# Patient Record
Sex: MALE | Race: WHITE | NOT HISPANIC OR LATINO | URBAN - NONMETROPOLITAN AREA
[De-identification: names, ages, dates, MRNs, and addresses within clinical notes are randomized per-mention and may not be internally consistent; named-entity substitution may affect disease eponyms.]

---

## 2023-04-05 ENCOUNTER — OFFICE VISIT (OUTPATIENT)
Dept: PRIMARY CARE | Facility: CLINIC | Age: 53
End: 2023-04-05
Payer: COMMERCIAL

## 2023-04-05 VITALS
HEART RATE: 81 BPM | HEIGHT: 70 IN | DIASTOLIC BLOOD PRESSURE: 70 MMHG | WEIGHT: 225 LBS | SYSTOLIC BLOOD PRESSURE: 128 MMHG | BODY MASS INDEX: 32.21 KG/M2 | OXYGEN SATURATION: 98 %

## 2023-04-05 DIAGNOSIS — Z13.1 DIABETES MELLITUS SCREENING: ICD-10-CM

## 2023-04-05 DIAGNOSIS — Z13.0 SCREENING FOR DEFICIENCY ANEMIA: ICD-10-CM

## 2023-04-05 DIAGNOSIS — Z00.00 ROUTINE GENERAL MEDICAL EXAMINATION AT A HEALTH CARE FACILITY: Primary | ICD-10-CM

## 2023-04-05 DIAGNOSIS — Z11.59 ENCOUNTER FOR HEPATITIS C SCREENING TEST FOR LOW RISK PATIENT: ICD-10-CM

## 2023-04-05 DIAGNOSIS — E78.2 MIXED HYPERLIPIDEMIA: ICD-10-CM

## 2023-04-05 DIAGNOSIS — K21.9 GASTROESOPHAGEAL REFLUX DISEASE WITHOUT ESOPHAGITIS: ICD-10-CM

## 2023-04-05 DIAGNOSIS — Z12.5 PROSTATE CANCER SCREENING: ICD-10-CM

## 2023-04-05 DIAGNOSIS — Z23 NEED FOR VACCINATION: ICD-10-CM

## 2023-04-05 DIAGNOSIS — Z13.220 LIPID SCREENING: ICD-10-CM

## 2023-04-05 DIAGNOSIS — I10 PRIMARY HYPERTENSION: ICD-10-CM

## 2023-04-05 DIAGNOSIS — Z12.11 COLON CANCER SCREENING: ICD-10-CM

## 2023-04-05 PROBLEM — E66.9 CLASS 1 OBESITY WITHOUT SERIOUS COMORBIDITY WITH BODY MASS INDEX (BMI) OF 32.0 TO 32.9 IN ADULT: Status: ACTIVE | Noted: 2023-04-05

## 2023-04-05 PROBLEM — K42.9 UMBILICAL HERNIA WITHOUT OBSTRUCTION AND WITHOUT GANGRENE: Status: ACTIVE | Noted: 2023-04-05

## 2023-04-05 PROBLEM — G47.33 OSA ON CPAP: Status: ACTIVE | Noted: 2023-04-05

## 2023-04-05 PROBLEM — H50.112 EXOTROPIA OF LEFT EYE: Status: ACTIVE | Noted: 2023-04-05

## 2023-04-05 PROBLEM — E66.811 CLASS 1 OBESITY WITHOUT SERIOUS COMORBIDITY WITH BODY MASS INDEX (BMI) OF 32.0 TO 32.9 IN ADULT: Status: ACTIVE | Noted: 2023-04-05

## 2023-04-05 LAB
ALANINE AMINOTRANSFERASE (SGPT) (U/L) IN SER/PLAS: 32 U/L (ref 10–52)
ALBUMIN (G/DL) IN SER/PLAS: 4.6 G/DL (ref 3.4–5)
ALKALINE PHOSPHATASE (U/L) IN SER/PLAS: 77 U/L (ref 33–120)
ANION GAP IN SER/PLAS: 13 MMOL/L (ref 10–20)
ASPARTATE AMINOTRANSFERASE (SGOT) (U/L) IN SER/PLAS: 22 U/L (ref 9–39)
BILIRUBIN TOTAL (MG/DL) IN SER/PLAS: 0.8 MG/DL (ref 0–1.2)
CALCIUM (MG/DL) IN SER/PLAS: 9.6 MG/DL (ref 8.6–10.3)
CARBON DIOXIDE, TOTAL (MMOL/L) IN SER/PLAS: 27 MMOL/L (ref 21–32)
CHLORIDE (MMOL/L) IN SER/PLAS: 100 MMOL/L (ref 98–107)
CHOLESTEROL (MG/DL) IN SER/PLAS: 152 MG/DL (ref 0–199)
CHOLESTEROL IN HDL (MG/DL) IN SER/PLAS: 37.1 MG/DL
CHOLESTEROL/HDL RATIO: 4.1
CREATININE (MG/DL) IN SER/PLAS: 0.87 MG/DL (ref 0.5–1.3)
ERYTHROCYTE DISTRIBUTION WIDTH (RATIO) BY AUTOMATED COUNT: 13.2 % (ref 11.5–14.5)
ERYTHROCYTE MEAN CORPUSCULAR HEMOGLOBIN CONCENTRATION (G/DL) BY AUTOMATED: 33 G/DL (ref 32–36)
ERYTHROCYTE MEAN CORPUSCULAR VOLUME (FL) BY AUTOMATED COUNT: 89 FL (ref 80–100)
ERYTHROCYTES (10*6/UL) IN BLOOD BY AUTOMATED COUNT: 5.26 X10E12/L (ref 4.5–5.9)
GFR MALE: >90 ML/MIN/1.73M2
GLUCOSE (MG/DL) IN SER/PLAS: 88 MG/DL (ref 74–99)
HEMATOCRIT (%) IN BLOOD BY AUTOMATED COUNT: 46.6 % (ref 41–52)
HEMOGLOBIN (G/DL) IN BLOOD: 15.4 G/DL (ref 13.5–17.5)
LDL: 75 MG/DL (ref 0–99)
LEUKOCYTES (10*3/UL) IN BLOOD BY AUTOMATED COUNT: 5.5 X10E9/L (ref 4.4–11.3)
NON HDL CHOLESTEROL: 115 MG/DL
PLATELETS (10*3/UL) IN BLOOD AUTOMATED COUNT: 256 X10E9/L (ref 150–450)
POTASSIUM (MMOL/L) IN SER/PLAS: 4.2 MMOL/L (ref 3.5–5.3)
PROTEIN TOTAL: 7.1 G/DL (ref 6.4–8.2)
SODIUM (MMOL/L) IN SER/PLAS: 136 MMOL/L (ref 136–145)
THYROTROPIN (MIU/L) IN SER/PLAS BY DETECTION LIMIT <= 0.05 MIU/L: 1.43 MIU/L (ref 0.44–3.98)
TRIGLYCERIDE (MG/DL) IN SER/PLAS: 200 MG/DL (ref 0–149)
UREA NITROGEN (MG/DL) IN SER/PLAS: 12 MG/DL (ref 6–23)
VLDL: 40 MG/DL (ref 0–40)

## 2023-04-05 PROCEDURE — 90471 IMMUNIZATION ADMIN: CPT | Performed by: FAMILY MEDICINE

## 2023-04-05 PROCEDURE — 85027 COMPLETE CBC AUTOMATED: CPT

## 2023-04-05 PROCEDURE — 99386 PREV VISIT NEW AGE 40-64: CPT | Performed by: FAMILY MEDICINE

## 2023-04-05 PROCEDURE — 1036F TOBACCO NON-USER: CPT | Performed by: FAMILY MEDICINE

## 2023-04-05 PROCEDURE — 99202 OFFICE O/P NEW SF 15 MIN: CPT | Performed by: FAMILY MEDICINE

## 2023-04-05 PROCEDURE — 3074F SYST BP LT 130 MM HG: CPT | Performed by: FAMILY MEDICINE

## 2023-04-05 PROCEDURE — 3078F DIAST BP <80 MM HG: CPT | Performed by: FAMILY MEDICINE

## 2023-04-05 PROCEDURE — 90715 TDAP VACCINE 7 YRS/> IM: CPT | Performed by: FAMILY MEDICINE

## 2023-04-05 PROCEDURE — 80053 COMPREHEN METABOLIC PANEL: CPT

## 2023-04-05 PROCEDURE — 36415 COLL VENOUS BLD VENIPUNCTURE: CPT | Performed by: FAMILY MEDICINE

## 2023-04-05 PROCEDURE — 80061 LIPID PANEL: CPT

## 2023-04-05 PROCEDURE — 84153 ASSAY OF PSA TOTAL: CPT

## 2023-04-05 PROCEDURE — 84443 ASSAY THYROID STIM HORMONE: CPT

## 2023-04-05 PROCEDURE — 86803 HEPATITIS C AB TEST: CPT

## 2023-04-05 RX ORDER — OMEPRAZOLE 20 MG/1
20 CAPSULE, DELAYED RELEASE ORAL
COMMUNITY
Start: 2012-04-17 | End: 2023-04-05 | Stop reason: SDUPTHER

## 2023-04-05 RX ORDER — ATORVASTATIN CALCIUM 20 MG/1
20 TABLET, FILM COATED ORAL DAILY
COMMUNITY
Start: 2023-01-22 | End: 2023-04-05 | Stop reason: SDUPTHER

## 2023-04-05 RX ORDER — ATORVASTATIN CALCIUM 20 MG/1
20 TABLET, FILM COATED ORAL DAILY
Qty: 90 TABLET | Refills: 3 | Status: SHIPPED | OUTPATIENT
Start: 2023-04-05 | End: 2024-01-22

## 2023-04-05 RX ORDER — LISINOPRIL 20 MG/1
20 TABLET ORAL DAILY
COMMUNITY
Start: 2012-04-17 | End: 2023-04-05 | Stop reason: SDUPTHER

## 2023-04-05 RX ORDER — LISINOPRIL 20 MG/1
20 TABLET ORAL DAILY
Qty: 90 TABLET | Refills: 3 | Status: SHIPPED | OUTPATIENT
Start: 2023-04-05 | End: 2024-01-22

## 2023-04-05 RX ORDER — OMEPRAZOLE 20 MG/1
20 CAPSULE, DELAYED RELEASE ORAL
Qty: 90 CAPSULE | Refills: 3 | Status: SHIPPED | OUTPATIENT
Start: 2023-04-05 | End: 2024-01-22

## 2023-04-05 ASSESSMENT — ENCOUNTER SYMPTOMS
PALPITATIONS: 0
BLOOD IN STOOL: 0
NERVOUS/ANXIOUS: 0
SHORTNESS OF BREATH: 0
CONFUSION: 0
FEVER: 0
SEIZURES: 0
JOINT SWELLING: 0
APPETITE CHANGE: 0
DIARRHEA: 0
COLOR CHANGE: 0
UNEXPECTED WEIGHT CHANGE: 0
TROUBLE SWALLOWING: 0
CONSTIPATION: 0
HEMATURIA: 0
DIFFICULTY URINATING: 0

## 2023-04-05 ASSESSMENT — PATIENT HEALTH QUESTIONNAIRE - PHQ9
SUM OF ALL RESPONSES TO PHQ9 QUESTIONS 1 AND 2: 0
2. FEELING DOWN, DEPRESSED OR HOPELESS: NOT AT ALL
1. LITTLE INTEREST OR PLEASURE IN DOING THINGS: NOT AT ALL

## 2023-04-05 NOTE — PROGRESS NOTES
"Subjective   Patient ID: Adam Jones is a 53 y.o. male who presents for New Patient Visit (NEW PATIENT,WANTS BASIC CHECK UP).  HPI  Pleasant 53 year old  male presents for a checkup. Patient is new patient to the practice previously seeing Dr. Garcia. Patient denies any health concerns, medications reviewed. Patient states that he has worn a CPAP machine for the last year and feels that it has helped him sleep and have more energy during the day. Patient states that he has had some \"sinus issues\" in the past but does not medicate for them. Patient states that if he feel a little stuffy he was told by Dr. Richter to take flonase before bed which he uses 1X q 2 weeks. Patient states that he has an umbilical hernia that has not been repaired as it has not caused him any problems. Patient states that it will cause him some pain once in awhile after doing a lot of physical work. Patient works in construction for himself. Patient states that he is exposed to dust but does not have any issues, does not wear protection. 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 Patient states his last hearing exam was 20 years ago. Patient states that he does have some mild ringing in his ears but it does not bother him. Patient states he feels he may have some decreased hearing loss. Patient denies any unusual muscle aches or joint pain but if he has any from working he will take Aleve and that is effective. Patient denies fever, chills, night sweats, weight loss or gain, SOB, CP, heart palpitations, reflux, cough, abd pain, N/V, diarrhea, constipation, numbness or tingling in extremities. Patient states that he quit smoking 20 years ago.    Review of Systems   Constitutional:  Negative for appetite change, fever and unexpected weight change.   HENT:  Negative for congestion and trouble swallowing.    Eyes:  Negative for visual disturbance.   Respiratory:  Negative for " CC: Diabetes, medication refill    HISTORY OF THE PRESENT ILLNESS: Patient is a 60 y.o. male. This pleasant patient is here today for evaluation and management of the following health problems.    Health Maintenance: Reports colonoscopy at the age of 52 with 10-year recall.      Type 2 diabetes mellitus  Chronic health problem, well-controlled with lifestyle measures.  Point-of-care hemoglobin A1c is 5.9%.  Exercising by doing karate and biking.  Denies polydipsia, polyuria.  On ACE and statin.        Chronic left shoulder pain  Chronic, intermittent problem.  Evaluated by a provider at [facility].  Told that it was bursitis.  Manages with NSAIDs and heat if needed.    KEVIN (obstructive sleep apnea)  Not wearing cpap any more, damaged  No new supplies.      HTN (hypertension)  This is a chronic health problem that is well controlled with current medications and lifestyle measures.  Taking amlodipine 5 mg daily and lisinopril 20 mg daily.  Exercising.  Does not smoke.  The patient denies chest pain, shortness of breath, headache, vision changes, epistaxis, or dyspnea on exertion.      Left lower quadrant abdominal pain  Resolved.  4/22/2020 11:18 AM     HISTORY/REASON FOR EXAM:  Pain  Intermittent left lower quadrant pain     TECHNIQUE/EXAM DESCRIPTION AND NUMBER OF VIEWS:  Sonographic images of the left lower quadrant.     COMPARISON: None     FINDINGS:  Sonographic images of the left lower quadrant do not demonstrate any solid or cystic mass. No hernia is identified.     IMPRESSION:     No abnormalities identified.              Allergies: Patient has no known allergies.    Current Outpatient Medications Ordered in Epic   Medication Sig Dispense Refill   • lisinopril (PRINIVIL) 20 MG Tab Take 1 Tab by mouth every day. 90 Tab 1   • amLODIPine (NORVASC) 5 MG Tab Take 1 Tab by mouth every day. 90 Tab 1   • atorvastatin (LIPITOR) 40 MG Tab Take 1 Tab by mouth every bedtime. 90 Tab 1   • aspirin EC (ECOTRIN) 325 MG  "shortness of breath.    Cardiovascular:  Negative for chest pain, palpitations and leg swelling.   Gastrointestinal:  Negative for blood in stool, constipation and diarrhea.   Genitourinary:  Negative for difficulty urinating and hematuria.   Musculoskeletal:  Negative for gait problem and joint swelling.   Skin:  Negative for color change.   Allergic/Immunologic: Negative for immunocompromised state.   Neurological:  Negative for seizures and syncope.   Psychiatric/Behavioral:  Negative for confusion and suicidal ideas. The patient is not nervous/anxious.        Objective   /70   Pulse 81   Ht 1.778 m (5' 10\")   Wt 102 kg (225 lb)   SpO2 98%   BMI 32.28 kg/m²     Physical Exam  Constitutional:       General: He is not in acute distress.     Appearance: Normal appearance. He is not ill-appearing.   HENT:      Head: Normocephalic and atraumatic.      Right Ear: Tympanic membrane normal.      Left Ear: Tympanic membrane normal.      Nose: Nose normal.      Mouth/Throat:      Mouth: Mucous membranes are moist.   Eyes:      Pupils: Pupils are equal, round, and reactive to light.   Cardiovascular:      Rate and Rhythm: Normal rate and regular rhythm.      Heart sounds: No murmur heard.     No friction rub. No gallop.   Pulmonary:      Effort: Pulmonary effort is normal.      Breath sounds: Normal breath sounds.   Abdominal:      General: Abdomen is flat. There is no distension.      Palpations: Abdomen is soft.      Tenderness: There is no abdominal tenderness. There is no guarding.      Hernia: No hernia is present.   Musculoskeletal:         General: Normal range of motion.   Skin:     General: Skin is warm and dry.   Neurological:      General: No focal deficit present.      Mental Status: He is alert. Mental status is at baseline.      Cranial Nerves: No cranial nerve deficit.      Motor: No weakness.      Gait: Gait normal.   Psychiatric:         Mood and Affect: Mood normal.         Behavior: Behavior " Tablet Delayed Response TAKE 1 TABLET BY MOUTH EVERY DAY 90 Tab 1   • Blood Glucose Monitoring Suppl Device Meter: Dispense Device of Insurance Preference. Sig. Use as directed for blood sugar monitoring. #1. NR. 1 Device 0   • Blood Glucose Monitoring Suppl Supplies Misc Test strips order: Test strips for patient's meter. Sig: use tid and prn ssx high or low sugar #100 RF x 3 100 Strip 3   • Lancets Misc Lancets order: Lancets for  meter. Sig: use fasting and prn ssx high or low sugar. #100 RF x 3 100 Each 3   • PREVIDENT 5000 BOOSTER PLUS 1.1 % Paste   3     No current [facility]-ordered facility-administered medications on file.        Past Medical History:   Diagnosis Date   • Hyperlipemia 4/29/2015   • Hypertension    • KEVIN (obstructive sleep apnea) 1/8/2015   • Pain    • Pneumonia     RESOLVED   • Snoring    • Type 2 diabetes mellitus (HCC) 1/8/2015   • Vertigo of central origin of both ears 1/9/2015       Past Surgical History:   Procedure Laterality Date   • CATARACT EXTRACTION WITH IOL Bilateral 2018   • SHOULDER ARTHROSCOPY  5/20/2010    [facility]   • DEBRIDEMENT  5/20/2010    Performed by [facility]NA KANG at [facility]   • ROTATOR CUFF REPAIR  5/20/2010    Performed by [facility] KAGN [facility]       Social History     Tobacco Use   • Smoking status: Passive Smoke Exposure - Never Smoker   • Smokeless tobacco: Never Used   Substance Use Topics   • Alcohol use: Yes     Alcohol/week: 0.6 oz     Types: 1 Glasses of wine per week     Comment: seldom   • Drug use: No       Family History   Problem Relation Age of Onset   • Heart Disease Mother    • Cancer Father    • Heart Disease Maternal Aunt    • Heart Disease Maternal Uncle    • Stroke Brother    • Sleep Apnea Neg Hx        ROS:   As in HPI, otherwise negative for chest pain, dyspnea, abdominal pain, dysuria, blood in stool, fever          Exam: /80 (BP  "Location: Left arm, Patient Position: Sitting, BP Cuff Size: Adult)   Pulse 74   Temp 36.8 °C (98.3 °F) (Temporal)   Resp 14   Ht 1.626 m (5' 4\")   Wt 101 kg (223 lb)   SpO2 96%  Body mass index is 38.28 kg/m².    General: Alert, pleasant, well nourished, well developed male in NAD  HEENT: Normocephalic. Eyes conjunctiva clear lids without ptosis, pupils equal and reactive to light, ears normal shape and contour, canals are clear bilaterally, tympanic membranes are pearly gray with good light reflex.   Neck: Supple without bruit. Thyroid is not enlarged.  Pulmonary: Clear to ausculation.  Normal effort. No rales, ronchi, or wheezing.  Cardiovascular: Normal rate and rhythm without murmur. Carotid and radial pulses are intact and equal bilaterally.  No lower extremity edema.  Abdomen: Soft, nontender, nondistended. Normal bowel sounds. Liver and spleen are not palpable  Neurologic: Grossly nonfocal  Lymph: No cervical or supraclavicular lymph nodes are palpable  Skin: Warm and dry.  Musculoskeletal: Normal gait.   Psych: Normal mood and affect. Alert and oriented. Judgment and insight is normal.    Please note that this dictation was created using voice recognition software. I have made every reasonable attempt to correct obvious errors, but I expect that there are errors of grammar and possibly content that I did not discover before finalizing the note.      Assessment/Plan  1. Type 2 diabetes mellitus without complication, without long-term current use of insulin (HCC)  Well-controlled.  Continue with lifestyle measures.  Did advise on weight loss.  - POCT  A1C    2. Essential hypertension  Well-controlled.  Continue with lisinopril and amlodipine, no changes.  Continue with lifestyle measures.  - lisinopril (PRINIVIL) 20 MG Tab; Take 1 Tab by mouth every day.  Dispense: 90 Tab; Refill: 1  - amLODIPine (NORVASC) 5 MG Tab; Take 1 Tab by mouth every day.  Dispense: 90 Tab; Refill: 1    3. Hyperlipidemia, " normal.         Thought Content: Thought content normal.         Judgment: Judgment normal.         Assessment/Plan   Problem List Items Addressed This Visit          Circulatory    Primary hypertension    Relevant Medications    lisinopril 20 mg tablet    Other Relevant Orders    TSH with reflex to Free T4 if abnormal       Digestive    Gastroesophageal reflux disease without esophagitis    Relevant Medications    omeprazole (PriLOSEC) 20 mg DR capsule       Other    Mixed hyperlipidemia    Relevant Medications    atorvastatin (Lipitor) 20 mg tablet     Other Visit Diagnoses       Routine general medical examination at a health care facility    -  Primary    Screening for deficiency anemia        Relevant Orders    CBC    Diabetes mellitus screening        Relevant Orders    Comprehensive Metabolic Panel    Lipid screening        Relevant Orders    Lipid Panel    Prostate cancer screening        Relevant Orders    Prostate Specific Antigen, Screen    Colon cancer screening        Relevant Orders    Cologuard® colon cancer screening    Encounter for hepatitis C screening test for low risk patient        Relevant Orders    Hepatitis C Antibody    Need for vaccination        Relevant Orders    Tdap vaccine, age 10 years and older  (BOOSTRIX) (Completed)        Hypertension:  - Lisinopril 20 mg    Hyperlipidemia:  - Lipitor 20 mg    GERD:  - Omeprazole 20 mg    Health Maintenance Reminder:  -Blood Work: Today  -PSA: Today  -Hep C: Today  -Colonoscopy: Cologuard  -Lung Cancer: quit >15y  -AAA: 65  -Shingrix: Next visit  -Prevnar 20: 65y  -Flu:  Yearly         unspecified hyperlipidemia type  Overdue for labs.  Requesting refill of atorvastatin today.  - atorvastatin (LIPITOR) 40 MG Tab; Take 1 Tab by mouth every bedtime.  Dispense: 90 Tab; Refill: 1    4. Chronic left shoulder pain  Continue with NSAIDs and heat as needed.    5. KEVIN (obstructive sleep apnea)  We will refer to sleep management.  In the meantime, patient will find out what supplies he needs and have the company contact me.  - REFERRAL TO PULMONARY AND SLEEP MEDICINE    6. Left lower quadrant abdominal pain  Resolved.    Patient will have previously ordered labs done in the next couple weeks.  Patient will return to clinic in 6 months or sooner if needed and pending lab results.

## 2023-04-06 LAB
HEPATITIS C VIRUS AB PRESENCE IN SERUM: NONREACTIVE
PROSTATE SPECIFIC ANTIGEN,SCREEN: 1.6 NG/ML (ref 0–4)

## 2023-04-28 LAB — NONINV COLON CA DNA+OCC BLD SCRN STL QL: NEGATIVE

## 2024-01-19 DIAGNOSIS — E78.2 MIXED HYPERLIPIDEMIA: ICD-10-CM

## 2024-01-19 DIAGNOSIS — K21.9 GASTROESOPHAGEAL REFLUX DISEASE WITHOUT ESOPHAGITIS: ICD-10-CM

## 2024-01-22 DIAGNOSIS — I10 PRIMARY HYPERTENSION: ICD-10-CM

## 2024-01-22 RX ORDER — LISINOPRIL 20 MG/1
20 TABLET ORAL DAILY
Qty: 90 TABLET | Refills: 3 | Status: SHIPPED | OUTPATIENT
Start: 2024-01-22

## 2024-01-22 RX ORDER — OMEPRAZOLE 20 MG/1
20 CAPSULE, DELAYED RELEASE ORAL DAILY
Qty: 90 CAPSULE | Refills: 0 | Status: SHIPPED | OUTPATIENT
Start: 2024-01-22 | End: 2024-04-05

## 2024-01-22 RX ORDER — ATORVASTATIN CALCIUM 20 MG/1
20 TABLET, FILM COATED ORAL DAILY
Qty: 90 TABLET | Refills: 0 | Status: SHIPPED | OUTPATIENT
Start: 2024-01-22 | End: 2024-04-05

## 2024-04-03 DIAGNOSIS — E78.2 MIXED HYPERLIPIDEMIA: ICD-10-CM

## 2024-04-03 DIAGNOSIS — K21.9 GASTROESOPHAGEAL REFLUX DISEASE WITHOUT ESOPHAGITIS: ICD-10-CM

## 2024-04-05 RX ORDER — OMEPRAZOLE 20 MG/1
20 CAPSULE, DELAYED RELEASE ORAL DAILY
Qty: 90 CAPSULE | Refills: 0 | Status: SHIPPED | OUTPATIENT
Start: 2024-04-05

## 2024-04-05 RX ORDER — ATORVASTATIN CALCIUM 20 MG/1
20 TABLET, FILM COATED ORAL DAILY
Qty: 90 TABLET | Refills: 0 | Status: SHIPPED | OUTPATIENT
Start: 2024-04-05

## 2024-06-16 DIAGNOSIS — E78.2 MIXED HYPERLIPIDEMIA: ICD-10-CM

## 2024-06-16 DIAGNOSIS — K21.9 GASTROESOPHAGEAL REFLUX DISEASE WITHOUT ESOPHAGITIS: ICD-10-CM

## 2024-06-17 RX ORDER — OMEPRAZOLE 20 MG/1
20 CAPSULE, DELAYED RELEASE ORAL DAILY
Qty: 90 CAPSULE | Refills: 0 | Status: SHIPPED | OUTPATIENT
Start: 2024-06-17

## 2024-06-17 RX ORDER — ATORVASTATIN CALCIUM 20 MG/1
20 TABLET, FILM COATED ORAL DAILY
Qty: 90 TABLET | Refills: 0 | Status: SHIPPED | OUTPATIENT
Start: 2024-06-17

## 2024-08-28 DIAGNOSIS — K21.9 GASTROESOPHAGEAL REFLUX DISEASE WITHOUT ESOPHAGITIS: ICD-10-CM

## 2024-08-28 DIAGNOSIS — E78.2 MIXED HYPERLIPIDEMIA: ICD-10-CM

## 2024-08-29 RX ORDER — ATORVASTATIN CALCIUM 20 MG/1
20 TABLET, FILM COATED ORAL DAILY
Qty: 90 TABLET | Refills: 0 | Status: SHIPPED | OUTPATIENT
Start: 2024-08-29

## 2024-08-29 RX ORDER — OMEPRAZOLE 20 MG/1
20 CAPSULE, DELAYED RELEASE ORAL DAILY
Qty: 90 CAPSULE | Refills: 0 | Status: SHIPPED | OUTPATIENT
Start: 2024-08-29

## 2024-09-25 ENCOUNTER — APPOINTMENT (OUTPATIENT)
Dept: PRIMARY CARE | Facility: CLINIC | Age: 54
End: 2024-09-25
Payer: COMMERCIAL

## 2024-09-25 VITALS
WEIGHT: 227.6 LBS | OXYGEN SATURATION: 98 % | HEART RATE: 71 BPM | BODY MASS INDEX: 32.58 KG/M2 | SYSTOLIC BLOOD PRESSURE: 130 MMHG | DIASTOLIC BLOOD PRESSURE: 78 MMHG | HEIGHT: 70 IN

## 2024-09-25 DIAGNOSIS — E78.2 MIXED HYPERLIPIDEMIA: ICD-10-CM

## 2024-09-25 DIAGNOSIS — Z13.0 SCREENING FOR IRON DEFICIENCY ANEMIA: ICD-10-CM

## 2024-09-25 DIAGNOSIS — Z12.5 SCREENING FOR PROSTATE CANCER: Primary | ICD-10-CM

## 2024-09-25 DIAGNOSIS — K42.9 UMBILICAL HERNIA WITHOUT OBSTRUCTION AND WITHOUT GANGRENE: ICD-10-CM

## 2024-09-25 DIAGNOSIS — K21.9 GASTROESOPHAGEAL REFLUX DISEASE WITHOUT ESOPHAGITIS: ICD-10-CM

## 2024-09-25 DIAGNOSIS — T78.40XA ALLERGY, INITIAL ENCOUNTER: ICD-10-CM

## 2024-09-25 DIAGNOSIS — I10 PRIMARY HYPERTENSION: ICD-10-CM

## 2024-09-25 DIAGNOSIS — Z13.1 SCREENING FOR DIABETES MELLITUS: ICD-10-CM

## 2024-09-25 DIAGNOSIS — Z00.00 ROUTINE GENERAL MEDICAL EXAMINATION AT A HEALTH CARE FACILITY: ICD-10-CM

## 2024-09-25 LAB
ALBUMIN SERPL BCP-MCNC: 4.4 G/DL (ref 3.4–5)
ALP SERPL-CCNC: 71 U/L (ref 33–120)
ALT SERPL W P-5'-P-CCNC: 36 U/L (ref 10–52)
ANION GAP SERPL CALC-SCNC: 12 MMOL/L (ref 10–20)
AST SERPL W P-5'-P-CCNC: 25 U/L (ref 9–39)
BASOPHILS # BLD AUTO: 0.03 X10*3/UL (ref 0–0.1)
BASOPHILS NFR BLD AUTO: 0.5 %
BILIRUB SERPL-MCNC: 0.7 MG/DL (ref 0–1.2)
BUN SERPL-MCNC: 12 MG/DL (ref 6–23)
CALCIUM SERPL-MCNC: 9.6 MG/DL (ref 8.6–10.3)
CHLORIDE SERPL-SCNC: 102 MMOL/L (ref 98–107)
CHOLEST SERPL-MCNC: 142 MG/DL (ref 0–199)
CHOLESTEROL/HDL RATIO: 4
CO2 SERPL-SCNC: 27 MMOL/L (ref 21–32)
CREAT SERPL-MCNC: 0.79 MG/DL (ref 0.5–1.3)
EGFRCR SERPLBLD CKD-EPI 2021: >90 ML/MIN/1.73M*2
EOSINOPHIL # BLD AUTO: 0.08 X10*3/UL (ref 0–0.7)
EOSINOPHIL NFR BLD AUTO: 1.4 %
ERYTHROCYTE [DISTWIDTH] IN BLOOD BY AUTOMATED COUNT: 12.1 % (ref 11.5–14.5)
GLUCOSE SERPL-MCNC: 92 MG/DL (ref 74–99)
HCT VFR BLD AUTO: 43.6 % (ref 41–52)
HDLC SERPL-MCNC: 35.3 MG/DL
HGB BLD-MCNC: 14.5 G/DL (ref 13.5–17.5)
IMM GRANULOCYTES # BLD AUTO: 0.02 X10*3/UL (ref 0–0.7)
IMM GRANULOCYTES NFR BLD AUTO: 0.4 % (ref 0–0.9)
LDLC SERPL CALC-MCNC: 56 MG/DL
LYMPHOCYTES # BLD AUTO: 1.4 X10*3/UL (ref 1.2–4.8)
LYMPHOCYTES NFR BLD AUTO: 25.2 %
MCH RBC QN AUTO: 28.6 PG (ref 26–34)
MCHC RBC AUTO-ENTMCNC: 33.3 G/DL (ref 32–36)
MCV RBC AUTO: 86 FL (ref 80–100)
MONOCYTES # BLD AUTO: 0.48 X10*3/UL (ref 0.1–1)
MONOCYTES NFR BLD AUTO: 8.6 %
NEUTROPHILS # BLD AUTO: 3.54 X10*3/UL (ref 1.2–7.7)
NEUTROPHILS NFR BLD AUTO: 63.9 %
NON HDL CHOLESTEROL: 107 MG/DL (ref 0–149)
NRBC BLD-RTO: 0 /100 WBCS (ref 0–0)
PLATELET # BLD AUTO: 243 X10*3/UL (ref 150–450)
POTASSIUM SERPL-SCNC: 4 MMOL/L (ref 3.5–5.3)
PROT SERPL-MCNC: 7.1 G/DL (ref 6.4–8.2)
PSA SERPL-MCNC: 2.06 NG/ML
RBC # BLD AUTO: 5.07 X10*6/UL (ref 4.5–5.9)
SODIUM SERPL-SCNC: 137 MMOL/L (ref 136–145)
TRIGL SERPL-MCNC: 256 MG/DL (ref 0–149)
VLDL: 51 MG/DL (ref 0–40)
WBC # BLD AUTO: 5.6 X10*3/UL (ref 4.4–11.3)

## 2024-09-25 PROCEDURE — 80053 COMPREHEN METABOLIC PANEL: CPT

## 2024-09-25 PROCEDURE — 80061 LIPID PANEL: CPT

## 2024-09-25 PROCEDURE — 84153 ASSAY OF PSA TOTAL: CPT

## 2024-09-25 PROCEDURE — 3078F DIAST BP <80 MM HG: CPT

## 2024-09-25 PROCEDURE — 3075F SYST BP GE 130 - 139MM HG: CPT

## 2024-09-25 PROCEDURE — 85025 COMPLETE CBC W/AUTO DIFF WBC: CPT

## 2024-09-25 PROCEDURE — 3008F BODY MASS INDEX DOCD: CPT

## 2024-09-25 PROCEDURE — 1036F TOBACCO NON-USER: CPT

## 2024-09-25 PROCEDURE — 99396 PREV VISIT EST AGE 40-64: CPT

## 2024-09-25 RX ORDER — FLUTICASONE PROPIONATE 50 MCG
1 SPRAY, SUSPENSION (ML) NASAL DAILY
Qty: 16 G | Refills: 11 | Status: SHIPPED | OUTPATIENT
Start: 2024-09-25 | End: 2025-09-25

## 2024-09-25 RX ORDER — LISINOPRIL 20 MG/1
20 TABLET ORAL DAILY
Qty: 90 TABLET | Refills: 3 | Status: SHIPPED | OUTPATIENT
Start: 2024-09-25

## 2024-09-25 RX ORDER — OMEPRAZOLE 20 MG/1
20 CAPSULE, DELAYED RELEASE ORAL DAILY
Qty: 90 CAPSULE | Refills: 3 | Status: SHIPPED | OUTPATIENT
Start: 2024-09-25

## 2024-09-25 RX ORDER — ATORVASTATIN CALCIUM 20 MG/1
20 TABLET, FILM COATED ORAL DAILY
Qty: 90 TABLET | Refills: 3 | Status: SHIPPED | OUTPATIENT
Start: 2024-09-25

## 2024-09-25 NOTE — PROGRESS NOTES
Subjective   Patient ID: Adam Jones is a 54 y.o. male who presents for Annual Exam.  HPI    - DILAN- wears CPAP. Feels like it is working well for him, daytime fatigue has been better  - HTN - on lisinopril 20 mg , 130/78 in office today.  Once in awhile takes it at home, usually 120s SBP  - GERD- on prilosec 20 mg; doing well as long as he takes the GERD. Various foods dont seem to affect it or help  - HLD on lipitor 20 mg, last lipid panel good.   - does have an umbilical hernia that he is ready to get fixed next year   - Denies drugs,alcohol. 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     Health Maintenance Reminder:  -Blood Work: cbc, cmp, tsh normal 4/2023; lipids at that time WNL except HDL 37,; rec to recheck in 1 year  -PSA: 1/6 in 4/2023  -Hep C: 2023- non reactive  -Colonoscopy: cologuard 4/2023 negative  -Lung Cancer (50-79yo w/ 20pack yr hx w/ current smoking or quit past 15 yrs): quit >15 yrs ago (18)  -AAA (1 time screening 65-75 if ever smoked): n/a yet  -Shingrix >50: declines today   -Prevnar 20: n/a   -Flu:  declines      Current Outpatient Medications:     atorvastatin (Lipitor) 20 mg tablet, Take 1 tablet (20 mg) by mouth once daily., Disp: 90 tablet, Rfl: 3    fluticasone (Flonase) 50 mcg/actuation nasal spray, Administer 1 spray into each nostril once daily. Shake gently. Before first use, prime pump. After use, clean tip and replace cap., Disp: 16 g, Rfl: 11    lisinopril 20 mg tablet, Take 1 tablet (20 mg) by mouth once daily., Disp: 90 tablet, Rfl: 3    omeprazole (PriLOSEC) 20 mg DR capsule, Take 1 capsule (20 mg) by mouth once daily., Disp: 90 capsule, Rfl: 3   History reviewed. No pertinent surgical history.   History reviewed. No pertinent past medical history.  Social History     Tobacco Use    Smoking status: Former     Current  "packs/day: 0.00     Types: Cigarettes     Quit date: 2006     Years since quittin.4    Smokeless tobacco: Never   Substance Use Topics    Alcohol use: Never     Alcohol/week: 12.0 standard drinks of alcohol     Types: 12 Cans of beer per week    Drug use: Never      No family history on file.   Review of Systems  10 point ROS negative except as otherwise noted in the HPI.      Objective   /78   Pulse 71   Ht 1.778 m (5' 10\")   Wt 103 kg (227 lb 9.6 oz)   SpO2 98%   BMI 32.66 kg/m²    Physical Exam  Constitutional:       General: He is not in acute distress.     Appearance: Normal appearance. He is not ill-appearing or toxic-appearing.   HENT:      Head: Normocephalic and atraumatic.      Right Ear: Tympanic membrane, ear canal and external ear normal.      Left Ear: Tympanic membrane, ear canal and external ear normal.      Nose: Nose normal. No congestion or rhinorrhea.      Mouth/Throat:      Mouth: Mucous membranes are moist.      Pharynx: Oropharynx is clear. No oropharyngeal exudate or posterior oropharyngeal erythema.   Eyes:      Extraocular Movements: Extraocular movements intact.      Conjunctiva/sclera: Conjunctivae normal.      Pupils: Pupils are equal, round, and reactive to light.   Neck:      Vascular: No carotid bruit.   Cardiovascular:      Rate and Rhythm: Normal rate and regular rhythm.      Pulses: Normal pulses.      Heart sounds: Normal heart sounds. No murmur heard.  Pulmonary:      Effort: Pulmonary effort is normal.      Breath sounds: Normal breath sounds. No wheezing, rhonchi or rales.   Abdominal:      General: Bowel sounds are normal. There is no distension.      Palpations: Abdomen is soft.      Tenderness: There is no abdominal tenderness. There is no guarding or rebound.      Hernia: A hernia is present.   Musculoskeletal:         General: Normal range of motion.      Cervical back: Normal range of motion and neck supple. No tenderness.      Right lower leg: No " edema.      Left lower leg: No edema.   Lymphadenopathy:      Cervical: No cervical adenopathy.   Skin:     General: Skin is warm and dry.      Findings: No rash.   Neurological:      General: No focal deficit present.      Mental Status: He is alert and oriented to person, place, and time.   Psychiatric:         Mood and Affect: Mood normal.         Behavior: Behavior normal.           Assessment/Plan   Problem List Items Addressed This Visit       Umbilical hernia without obstruction and without gangrene  -has had hernia for years, ready to get it fixed or get surgery opinion    Relevant Orders    Referral to General Surgery    Primary hypertension  - Doing well, running in 120s systolic at home, 130 here today. Continue lisinopril 20 mg daily    Relevant Medications    lisinopril 20 mg tablet    Mixed hyperlipidemia  - will check lipids today, otherwise continue lipitor 20 mg daily     Relevant Medications    atorvastatin (Lipitor) 20 mg tablet    Other Relevant Orders    Lipid Panel    Gastroesophageal reflux disease without esophagitis  - well controlled, continue omeprazole 20 mg daily    Relevant Medications    omeprazole (PriLOSEC) 20 mg DR capsule     Other Visit Diagnoses       Screening for prostate cancer    -  Primary    Relevant Orders    PSA    Allergy, initial encounter        Relevant Medications    fluticasone (Flonase) 50 mcg/actuation nasal spray    Screening for iron deficiency anemia        Relevant Orders    CBC and Auto Differential    Screening for diabetes mellitus        Relevant Orders    Comprehensive metabolic panel            Discussed at visit any disease processes that were of concern as well as the risks, benefits and instructions on any new medication provided. Patient (and/or caretaker of patient if present) stated all questions were answered, and they voiced understanding of instructions.     Remedios Castro PA-C

## 2024-09-25 NOTE — PATIENT INSTRUCTIONS
You are doing well! We will check some labs today and I will call with results  Continue current medications   Follow up in 1 year or sooner as needed

## 2024-10-22 ENCOUNTER — APPOINTMENT (OUTPATIENT)
Dept: SURGERY | Facility: CLINIC | Age: 54
End: 2024-10-22
Payer: COMMERCIAL

## 2024-10-22 DIAGNOSIS — Z01.818 PRE-OP EXAM: Primary | ICD-10-CM

## 2024-10-22 DIAGNOSIS — K42.9 UMBILICAL HERNIA WITHOUT OBSTRUCTION AND WITHOUT GANGRENE: ICD-10-CM

## 2024-10-22 PROCEDURE — 99213 OFFICE O/P EST LOW 20 MIN: CPT | Performed by: SURGERY

## 2024-10-22 RX ORDER — CEFAZOLIN SODIUM 2 G/100ML
2 INJECTION, SOLUTION INTRAVENOUS ONCE
OUTPATIENT
Start: 2024-10-22 | End: 2024-10-22

## 2024-10-22 RX ORDER — ENOXAPARIN SODIUM 300 MG/3ML
30 INJECTION INTRAVENOUS; SUBCUTANEOUS ONCE
OUTPATIENT
Start: 2024-10-22 | End: 2024-10-22

## 2024-10-22 NOTE — PROGRESS NOTES
General Surgery History and Physical    Referring Provider:  DO Gloria Peck Natalie R, PA-C     Chief Complaint:  Chief Complaint   Patient presents with    New Patient Visit     Eval umbilical hernia        History of Present Illness:  Adam Jones is a 54 y.o. male who presents with  umbilical hernia.   Has had bulge for about 5-6 yrs   No obstruction symptoms   Tender to touch  Wants it to be fixed           Past Medical History:  No past medical history on file.   HTN   Past Surgical History:  No past surgical history on file.     Medications:  Current Outpatient Medications   Medication Instructions    atorvastatin (LIPITOR) 20 mg, oral, Daily    fluticasone (Flonase) 50 mcg/actuation nasal spray 1 spray, Each Nostril, Daily, Shake gently. Before first use, prime pump. After use, clean tip and replace cap.    lisinopril 20 mg, oral, Daily    omeprazole (PRILOSEC) 20 mg, oral, Daily        Allergies:  No Known Allergies     Family History:  No family history on file.     Social History:  Social History     Socioeconomic History    Marital status:    Tobacco Use    Smoking status: Former     Current packs/day: 0.00     Types: Cigarettes     Quit date: 2006     Years since quittin.5    Smokeless tobacco: Never   Substance and Sexual Activity    Alcohol use: Never     Alcohol/week: 12.0 standard drinks of alcohol     Types: 12 Cans of beer per week    Drug use: Never        Review of Systems:  A complete 12 point review of systems was performed. Please see scanned questionnaire and is otherwise negative except as noted in the history of present illness.    Vital Signs:  There were no vitals filed for this visit.   There is no height or weight on file to calculate BMI.      Physical Exam:  General: No acute distress.   Neuro: Alert and oriented ×3. Follows commands.  Face: Atraumatic, no visible skin lesion.   Head: Atraumatic  Eyes: Pupils equal reactive to light. Extraocular motions  intact.  Ears: Hears normal speaking voice.  Mouth, Nose, Throat: Mucous membranes moist.  Normal dentition.  Neck: Supple. No visible masses.  Breast: Not examined.   Lung: Patent airway and no labored breath.   Vascular: Palpable radial pulses bilaterally.  Abdomen: Soft, NT,ND.  Partially reducible UH, not able to feel the defect due to body habitus. Bulge about 3cm is size, superior side of umbilicus.   Rectal and Perianal: Not examined.   Genitourinary: Not examined.   Musculoskeletal: Moves all extremities.  Normal range of motion.  Extremities: No cyanosis. No edema.   Skin: No rashes or lesions.  Psychological: Normal affect      Laboratory Values:  CBC:   Lab Results   Component Value Date    WBC 5.6 09/25/2024    RBC 5.07 09/25/2024    HGB 14.5 09/25/2024    HCT 43.6 09/25/2024     09/25/2024       RFP:   Lab Results   Component Value Date     09/25/2024    K 4.0 09/25/2024     09/25/2024    CO2 27 09/25/2024    BUN 12 09/25/2024    CREATININE 0.79 09/25/2024    CALCIUM 9.6 09/25/2024    MG 1.87 05/14/2018        LFTs:   Lab Results   Component Value Date    PROT 7.1 09/25/2024    ALBUMIN 4.4 09/25/2024    BILITOT 0.7 09/25/2024    ALKPHOS 71 09/25/2024    AST 25 09/25/2024    ALT 36 09/25/2024            Imaging:  I have personally reviewed the images and the radiologist's report.  No results found.      Assessment:  This is a 54 y.o. male who presents with follow conditions:   Umbilical hernia: reducible partially     Plan:  I discussed treatment options and recommended open umbilical hernia repair with possible Ventralex mesh. Patient understood the risks of surgery include but are not limited to infection, bleeding, postoperative pain, hernia recurrence, possible mesh related complications and intra-abdominal injury which may require additional surgery to fix it, risks of anesthesia, mortality in rare/extreme case.  Patient wished to proceed with the surgery.      His labs from  September looked ok other than lipid high. He will need EKG. No formal PAT needed.     He will call back to schedule surgery once he figures out his schedule.  Patient was advised to call or come to the emergency department if signs or symptoms of obstruction or strangulation.        Mireille Hopper MD [facility]  General Surgery  Office: [phone]  Fax:     [phone]  3:33 PM   10/22/24

## 2024-11-21 ENCOUNTER — HOSPITAL ENCOUNTER (OUTPATIENT)
Dept: CARDIOLOGY | Facility: HOSPITAL | Age: 54
Discharge: HOME | End: 2024-11-21
Payer: COMMERCIAL

## 2024-11-21 DIAGNOSIS — K42.9 UMBILICAL HERNIA WITHOUT OBSTRUCTION AND WITHOUT GANGRENE: ICD-10-CM

## 2024-11-21 DIAGNOSIS — Z01.818 PRE-OP EXAM: ICD-10-CM

## 2024-11-21 LAB
ATRIAL RATE: 90 BPM
P AXIS: 32 DEGREES
P OFFSET: 176 MS
P ONSET: 115 MS
PR INTERVAL: 226 MS
Q ONSET: 228 MS
QRS COUNT: 15 BEATS
QRS DURATION: 84 MS
QT INTERVAL: 348 MS
QTC CALCULATION(BAZETT): 425 MS
QTC FREDERICIA: 398 MS
R AXIS: 73 DEGREES
T AXIS: 64 DEGREES
T OFFSET: 402 MS
VENTRICULAR RATE: 90 BPM

## 2024-11-21 PROCEDURE — 93005 ELECTROCARDIOGRAM TRACING: CPT

## 2024-12-13 ENCOUNTER — OFFICE VISIT (OUTPATIENT)
Dept: PRIMARY CARE | Facility: CLINIC | Age: 54
End: 2024-12-13
Payer: COMMERCIAL

## 2024-12-13 ENCOUNTER — TELEPHONE (OUTPATIENT)
Dept: PRIMARY CARE | Facility: CLINIC | Age: 54
End: 2024-12-13

## 2024-12-13 VITALS
BODY MASS INDEX: 33 KG/M2 | SYSTOLIC BLOOD PRESSURE: 116 MMHG | WEIGHT: 230 LBS | HEART RATE: 92 BPM | DIASTOLIC BLOOD PRESSURE: 80 MMHG | OXYGEN SATURATION: 98 %

## 2024-12-13 DIAGNOSIS — J01.00 ACUTE NON-RECURRENT MAXILLARY SINUSITIS: Primary | ICD-10-CM

## 2024-12-13 PROCEDURE — 3079F DIAST BP 80-89 MM HG: CPT | Performed by: FAMILY MEDICINE

## 2024-12-13 PROCEDURE — 99213 OFFICE O/P EST LOW 20 MIN: CPT | Performed by: FAMILY MEDICINE

## 2024-12-13 PROCEDURE — 3074F SYST BP LT 130 MM HG: CPT | Performed by: FAMILY MEDICINE

## 2024-12-13 RX ORDER — AMOXICILLIN AND CLAVULANATE POTASSIUM 875; 125 MG/1; MG/1
875 TABLET, FILM COATED ORAL 2 TIMES DAILY
Qty: 20 TABLET | Refills: 0 | Status: SHIPPED | OUTPATIENT
Start: 2024-12-13 | End: 2024-12-23

## 2024-12-13 ASSESSMENT — ENCOUNTER SYMPTOMS
DIFFICULTY URINATING: 0
SORE THROAT: 1
TROUBLE SWALLOWING: 0
DIARRHEA: 0
APPETITE CHANGE: 0
BLOOD IN STOOL: 0
COLOR CHANGE: 0
FEVER: 0
SEIZURES: 0
SHORTNESS OF BREATH: 0
UNEXPECTED WEIGHT CHANGE: 0
HEMATURIA: 0
NERVOUS/ANXIOUS: 0
PALPITATIONS: 0
CONSTIPATION: 0
JOINT SWELLING: 0
CONFUSION: 0

## 2024-12-13 NOTE — TELEPHONE ENCOUNTER
He needs appt, he has had sore throat off and on since thanksgiving, can be today or he said he can wait till next week

## 2024-12-13 NOTE — PROGRESS NOTES
Subjective   Patient ID: Adam Jones is a 54 y.o. male who presents for Sore Throat (Off and on since thanksgiving, was given amoxicillin from [facility] but came back, Sunday night pt was puking a lot and diarrhea was gone within 24 hours  ).  Sore Throat   Pertinent negatives include no congestion, diarrhea, shortness of breath or trouble swallowing.     2d wk having ST, lost voice, went to [facility] and given amoxil. Then went to [facility] and develop n/v and now ST back.   No sinus congestion  +cough  No fever  No n/v since   Worsening ST and PND    Review of Systems   Constitutional:  Negative for appetite change, fever and unexpected weight change.   HENT:  Positive for sore throat. Negative for congestion and trouble swallowing.    Eyes:  Negative for visual disturbance.   Respiratory:  Negative for shortness of breath.    Cardiovascular:  Negative for chest pain, palpitations and leg swelling.   Gastrointestinal:  Negative for blood in stool, constipation and diarrhea.   Genitourinary:  Negative for difficulty urinating and hematuria.   Musculoskeletal:  Negative for gait problem and joint swelling.   Skin:  Negative for color change.   Allergic/Immunologic: Negative for immunocompromised state.   Neurological:  Negative for seizures and syncope.   Psychiatric/Behavioral:  Negative for confusion and suicidal ideas. The patient is not nervous/anxious.        Objective   /80   Pulse 92   Wt 104 kg (230 lb)   SpO2 98%   BMI 33.00 kg/m²     Physical Exam  Constitutional:       General: He is not in acute distress.     Appearance: Normal appearance. He is not toxic-appearing.   HENT:      Head: Normocephalic.      Right Ear: Tympanic membrane normal.      Left Ear: Tympanic membrane normal.      Nose: Nose normal. No congestion or rhinorrhea.      Mouth/Throat:      Mouth: Mucous membranes are moist.      Pharynx: Posterior oropharyngeal erythema present. No oropharyngeal exudate.   Eyes:       General:         Right eye: No discharge.         Left eye: No discharge.      Pupils: Pupils are equal, round, and reactive to light.   Cardiovascular:      Rate and Rhythm: Normal rate and regular rhythm.      Heart sounds: No murmur heard.  Pulmonary:      Effort: No respiratory distress.      Breath sounds: No wheezing or rhonchi.   Abdominal:      General: Abdomen is flat.      Palpations: Abdomen is soft.   Lymphadenopathy:      Cervical: Cervical adenopathy present.   Skin:     General: Skin is warm and dry.      Capillary Refill: Capillary refill takes less than 2 seconds.   Neurological:      General: No focal deficit present.      Mental Status: He is alert.   Psychiatric:         Mood and Affect: Mood normal.       Assessment/Plan   Problem List Items Addressed This Visit    None    Hypertension:  - Lisinopril 20 mg     Hyperlipidemia:  - Lipitor 20 mg     GERD:  - Omeprazole 20 mg     ST/Sinus;  -recommend a 10d course of abx over 7d  -augmentin     Health Maintenance Reminder:  -Blood Work: 9/25  -PSA: 9/25  -Hep C: completed  -Colonoscopy: Cologuard  -Lung Cancer: quit >15y  -AAA: 67  -Shingrix: Next visit  -Prevnar 20: 65y  -Flu:  Yearly

## 2024-12-19 ENCOUNTER — ANESTHESIA EVENT (OUTPATIENT)
Dept: OPERATING ROOM | Facility: HOSPITAL | Age: 54
End: 2024-12-19
Payer: COMMERCIAL

## 2024-12-23 ENCOUNTER — TELEPHONE (OUTPATIENT)
Dept: PREADMISSION TESTING | Facility: HOSPITAL | Age: 54
End: 2024-12-23
Payer: COMMERCIAL

## 2024-12-23 NOTE — TELEPHONE ENCOUNTER
SURGERY PRE-OPERATIVE INSTRUCTIONS    *You will receive a phone call the day before your procedure  after 2pm, (or the Friday before your surgery if scheduled on a Monday.) Generally the hospital will be calling you with this information after that time.    *You are not to eat after midnight the night before the surgery. You may have up to 13 ounces of clear liquids up until 2 hours prior to arriving to the hospital. The exception is with medications you were instructed to take day of surgery.    *You may take tylenol for pain/discomfort as needed.     *Stop taking all aspirin products, ibuprofen (motrin/advil), naproxen (aleve/naprosyn) for one week prior to surgery.    *Stop taking all vitamins and supplements one week prior to surgery.     *You should not have alcoholic beverages for 24 hours before surgery.     *You should not smoke 24 hours prior to surgery.     *To help prevent surgical infections bathe/shower with Dial soap the evening before surgery.    *You can wear deodorant but no lotion, powder, or perfume/cologne. You should remove all make-up and nail polish at home.    *If you wear glasses, please bring a case for the glasses with you.    *You will be asked to remove dentures and contacts.     *Please leave all valuables at home.    *You should wear loose, comfortable clothing that will accommodate bandages and/or casts.    *You should notify your doctor of any change in your condition (fever, cold, rash, etc). Surgery may need to be re-scheduled until a time you are in better health.    *A responsible adult is required to accompany you to and from the hospital if you are receiving anesthesia or a sedative. Patients are not permitted to drive for 24 hours after anesthesia.     *You can use the [facility] parking if you wish.     *If you have any further questions please call [facility] [phone]. Pt. Not to take lisinopril am of procedure.  LT

## 2024-12-27 ENCOUNTER — HOSPITAL ENCOUNTER (OUTPATIENT)
Facility: HOSPITAL | Age: 54
Setting detail: OUTPATIENT SURGERY
Discharge: HOME | End: 2024-12-27
Attending: SURGERY | Admitting: SURGERY
Payer: COMMERCIAL

## 2024-12-27 ENCOUNTER — PHARMACY VISIT (OUTPATIENT)
Dept: PHARMACY | Facility: CLINIC | Age: 54
End: 2024-12-27
Payer: COMMERCIAL

## 2024-12-27 ENCOUNTER — ANESTHESIA (OUTPATIENT)
Dept: OPERATING ROOM | Facility: HOSPITAL | Age: 54
End: 2024-12-27
Payer: COMMERCIAL

## 2024-12-27 VITALS
WEIGHT: 225.09 LBS | RESPIRATION RATE: 18 BRPM | SYSTOLIC BLOOD PRESSURE: 140 MMHG | OXYGEN SATURATION: 96 % | TEMPERATURE: 97.2 F | HEART RATE: 85 BPM | BODY MASS INDEX: 32.22 KG/M2 | HEIGHT: 70 IN | DIASTOLIC BLOOD PRESSURE: 64 MMHG

## 2024-12-27 DIAGNOSIS — Z87.19 S/P HERNIA REPAIR: ICD-10-CM

## 2024-12-27 DIAGNOSIS — K42.9 UMBILICAL HERNIA WITHOUT OBSTRUCTION AND WITHOUT GANGRENE: Primary | ICD-10-CM

## 2024-12-27 DIAGNOSIS — Z98.890 S/P HERNIA REPAIR: ICD-10-CM

## 2024-12-27 PROCEDURE — 3700000002 HC GENERAL ANESTHESIA TIME - EACH INCREMENTAL 1 MINUTE: Performed by: SURGERY

## 2024-12-27 PROCEDURE — 96372 THER/PROPH/DIAG INJ SC/IM: CPT | Performed by: SURGERY

## 2024-12-27 PROCEDURE — 2500000004 HC RX 250 GENERAL PHARMACY W/ HCPCS (ALT 636 FOR OP/ED): Performed by: ANESTHESIOLOGY

## 2024-12-27 PROCEDURE — 7100000010 HC PHASE TWO TIME - EACH INCREMENTAL 1 MINUTE: Performed by: SURGERY

## 2024-12-27 PROCEDURE — 7100000002 HC RECOVERY ROOM TIME - EACH INCREMENTAL 1 MINUTE: Performed by: SURGERY

## 2024-12-27 PROCEDURE — 2780000003 HC OR 278 NO HCPCS: Performed by: SURGERY

## 2024-12-27 PROCEDURE — 2720000007 HC OR 272 NO HCPCS: Performed by: SURGERY

## 2024-12-27 PROCEDURE — 49592 RPR AA HRN 1ST < 3 NCR/STRN: CPT | Performed by: SURGERY

## 2024-12-27 PROCEDURE — RXMED WILLOW AMBULATORY MEDICATION CHARGE

## 2024-12-27 PROCEDURE — 7100000009 HC PHASE TWO TIME - INITIAL BASE CHARGE: Performed by: SURGERY

## 2024-12-27 PROCEDURE — 7100000001 HC RECOVERY ROOM TIME - INITIAL BASE CHARGE: Performed by: SURGERY

## 2024-12-27 PROCEDURE — 3600000008 HC OR TIME - EACH INCREMENTAL 1 MINUTE - PROCEDURE LEVEL THREE: Performed by: SURGERY

## 2024-12-27 PROCEDURE — 3600000003 HC OR TIME - INITIAL BASE CHARGE - PROCEDURE LEVEL THREE: Performed by: SURGERY

## 2024-12-27 PROCEDURE — 3700000001 HC GENERAL ANESTHESIA TIME - INITIAL BASE CHARGE: Performed by: SURGERY

## 2024-12-27 PROCEDURE — 2500000004 HC RX 250 GENERAL PHARMACY W/ HCPCS (ALT 636 FOR OP/ED): Performed by: SURGERY

## 2024-12-27 PROCEDURE — C1781 MESH (IMPLANTABLE): HCPCS | Performed by: SURGERY

## 2024-12-27 PROCEDURE — 2500000004 HC RX 250 GENERAL PHARMACY W/ HCPCS (ALT 636 FOR OP/ED): Performed by: NURSE ANESTHETIST, CERTIFIED REGISTERED

## 2024-12-27 DEVICE — PATCH, HERNIA, VENTRALEX ST, LRG, 3.2 X 3.2: Type: IMPLANTABLE DEVICE | Site: UMBILICAL | Status: FUNCTIONAL

## 2024-12-27 RX ORDER — SODIUM CHLORIDE, SODIUM LACTATE, POTASSIUM CHLORIDE, CALCIUM CHLORIDE 600; 310; 30; 20 MG/100ML; MG/100ML; MG/100ML; MG/100ML
20 INJECTION, SOLUTION INTRAVENOUS CONTINUOUS
Status: DISCONTINUED | OUTPATIENT
Start: 2024-12-27 | End: 2024-12-27 | Stop reason: HOSPADM

## 2024-12-27 RX ORDER — OXYCODONE HYDROCHLORIDE 5 MG/1
5 TABLET ORAL EVERY 4 HOURS PRN
Status: DISCONTINUED | OUTPATIENT
Start: 2024-12-27 | End: 2024-12-27 | Stop reason: HOSPADM

## 2024-12-27 RX ORDER — CEFAZOLIN SODIUM 2 G/100ML
2 INJECTION, SOLUTION INTRAVENOUS ONCE
Status: DISCONTINUED | OUTPATIENT
Start: 2024-12-27 | End: 2024-12-27 | Stop reason: HOSPADM

## 2024-12-27 RX ORDER — FENTANYL CITRATE 50 UG/ML
INJECTION, SOLUTION INTRAMUSCULAR; INTRAVENOUS AS NEEDED
Status: DISCONTINUED | OUTPATIENT
Start: 2024-12-27 | End: 2024-12-27

## 2024-12-27 RX ORDER — ALBUTEROL SULFATE 0.83 MG/ML
2.5 SOLUTION RESPIRATORY (INHALATION) ONCE AS NEEDED
Status: DISCONTINUED | OUTPATIENT
Start: 2024-12-27 | End: 2024-12-27 | Stop reason: HOSPADM

## 2024-12-27 RX ORDER — MIDAZOLAM HYDROCHLORIDE 1 MG/ML
INJECTION INTRAMUSCULAR; INTRAVENOUS AS NEEDED
Status: DISCONTINUED | OUTPATIENT
Start: 2024-12-27 | End: 2024-12-27

## 2024-12-27 RX ORDER — DIPHENHYDRAMINE HYDROCHLORIDE 50 MG/ML
12.5 INJECTION INTRAMUSCULAR; INTRAVENOUS ONCE AS NEEDED
Status: DISCONTINUED | OUTPATIENT
Start: 2024-12-27 | End: 2024-12-27 | Stop reason: HOSPADM

## 2024-12-27 RX ORDER — KETOROLAC TROMETHAMINE 30 MG/ML
INJECTION, SOLUTION INTRAMUSCULAR; INTRAVENOUS AS NEEDED
Status: DISCONTINUED | OUTPATIENT
Start: 2024-12-27 | End: 2024-12-27

## 2024-12-27 RX ORDER — ROCURONIUM BROMIDE 10 MG/ML
INJECTION, SOLUTION INTRAVENOUS AS NEEDED
Status: DISCONTINUED | OUTPATIENT
Start: 2024-12-27 | End: 2024-12-27

## 2024-12-27 RX ORDER — DROPERIDOL 2.5 MG/ML
0.62 INJECTION, SOLUTION INTRAMUSCULAR; INTRAVENOUS ONCE AS NEEDED
Status: DISCONTINUED | OUTPATIENT
Start: 2024-12-27 | End: 2024-12-27 | Stop reason: HOSPADM

## 2024-12-27 RX ORDER — DOCUSATE SODIUM 100 MG/1
100 CAPSULE, LIQUID FILLED ORAL 2 TIMES DAILY
Qty: 14 CAPSULE | Refills: 0 | Status: SHIPPED | OUTPATIENT
Start: 2024-12-27 | End: 2025-01-03

## 2024-12-27 RX ORDER — PROPOFOL 10 MG/ML
INJECTION, EMULSION INTRAVENOUS AS NEEDED
Status: DISCONTINUED | OUTPATIENT
Start: 2024-12-27 | End: 2024-12-27

## 2024-12-27 RX ORDER — ONDANSETRON HYDROCHLORIDE 2 MG/ML
4 INJECTION, SOLUTION INTRAVENOUS ONCE AS NEEDED
Status: DISCONTINUED | OUTPATIENT
Start: 2024-12-27 | End: 2024-12-27 | Stop reason: HOSPADM

## 2024-12-27 RX ORDER — CEFAZOLIN 1 G/1
INJECTION, POWDER, FOR SOLUTION INTRAVENOUS AS NEEDED
Status: DISCONTINUED | OUTPATIENT
Start: 2024-12-27 | End: 2024-12-27

## 2024-12-27 RX ORDER — SODIUM CHLORIDE, SODIUM LACTATE, POTASSIUM CHLORIDE, CALCIUM CHLORIDE 600; 310; 30; 20 MG/100ML; MG/100ML; MG/100ML; MG/100ML
100 INJECTION, SOLUTION INTRAVENOUS CONTINUOUS
Status: DISCONTINUED | OUTPATIENT
Start: 2024-12-27 | End: 2024-12-27 | Stop reason: HOSPADM

## 2024-12-27 RX ORDER — SUCCINYLCHOLINE CHLORIDE 20 MG/ML
INJECTION INTRAMUSCULAR; INTRAVENOUS AS NEEDED
Status: DISCONTINUED | OUTPATIENT
Start: 2024-12-27 | End: 2024-12-27

## 2024-12-27 RX ORDER — ENOXAPARIN SODIUM 100 MG/ML
30 INJECTION SUBCUTANEOUS ONCE
Status: COMPLETED | OUTPATIENT
Start: 2024-12-27 | End: 2024-12-27

## 2024-12-27 RX ORDER — LIDOCAINE HYDROCHLORIDE 40 MG/ML
INJECTION, SOLUTION RETROBULBAR AS NEEDED
Status: DISCONTINUED | OUTPATIENT
Start: 2024-12-27 | End: 2024-12-27

## 2024-12-27 RX ORDER — CYCLOBENZAPRINE HCL 5 MG
5 TABLET ORAL 3 TIMES DAILY
Qty: 21 TABLET | Refills: 0 | Status: SHIPPED | OUTPATIENT
Start: 2024-12-27 | End: 2025-01-03

## 2024-12-27 RX ORDER — MEPERIDINE HYDROCHLORIDE 25 MG/ML
12.5 INJECTION INTRAMUSCULAR; INTRAVENOUS; SUBCUTANEOUS EVERY 10 MIN PRN
Status: DISCONTINUED | OUTPATIENT
Start: 2024-12-27 | End: 2024-12-27 | Stop reason: HOSPADM

## 2024-12-27 RX ORDER — ONDANSETRON HYDROCHLORIDE 2 MG/ML
INJECTION, SOLUTION INTRAVENOUS AS NEEDED
Status: DISCONTINUED | OUTPATIENT
Start: 2024-12-27 | End: 2024-12-27

## 2024-12-27 RX ORDER — LIDOCAINE HYDROCHLORIDE 10 MG/ML
INJECTION, SOLUTION INFILTRATION; PERINEURAL AS NEEDED
Status: DISCONTINUED | OUTPATIENT
Start: 2024-12-27 | End: 2024-12-27

## 2024-12-27 RX ORDER — HYDROCODONE BITARTRATE AND ACETAMINOPHEN 5; 325 MG/1; MG/1
1 TABLET ORAL EVERY 6 HOURS PRN
Qty: 30 TABLET | Refills: 0 | Status: SHIPPED | OUTPATIENT
Start: 2024-12-27

## 2024-12-27 RX ADMIN — SODIUM CHLORIDE, POTASSIUM CHLORIDE, SODIUM LACTATE AND CALCIUM CHLORIDE 20 ML/HR: 600; 310; 30; 20 INJECTION, SOLUTION INTRAVENOUS at 10:14

## 2024-12-27 RX ADMIN — HYDROMORPHONE HYDROCHLORIDE 0.5 MG: 1 INJECTION, SOLUTION INTRAMUSCULAR; INTRAVENOUS; SUBCUTANEOUS at 12:38

## 2024-12-27 RX ADMIN — ENOXAPARIN SODIUM 30 MG: 30 INJECTION SUBCUTANEOUS at 10:15

## 2024-12-27 SDOH — HEALTH STABILITY: MENTAL HEALTH: CURRENT SMOKER: 0

## 2024-12-27 ASSESSMENT — PAIN SCALES - GENERAL
PAINLEVEL_OUTOF10: 7
PAINLEVEL_OUTOF10: 4
PAINLEVEL_OUTOF10: 3
PAIN_LEVEL: 2
PAINLEVEL_OUTOF10: 3
PAINLEVEL_OUTOF10: 3

## 2024-12-27 ASSESSMENT — COLUMBIA-SUICIDE SEVERITY RATING SCALE - C-SSRS
6. HAVE YOU EVER DONE ANYTHING, STARTED TO DO ANYTHING, OR PREPARED TO DO ANYTHING TO END YOUR LIFE?: NO
2. HAVE YOU ACTUALLY HAD ANY THOUGHTS OF KILLING YOURSELF?: NO
1. IN THE PAST MONTH, HAVE YOU WISHED YOU WERE DEAD OR WISHED YOU COULD GO TO SLEEP AND NOT WAKE UP?: NO

## 2024-12-27 ASSESSMENT — PAIN DESCRIPTION - DESCRIPTORS: DESCRIPTORS: ACHING

## 2024-12-27 ASSESSMENT — PAIN - FUNCTIONAL ASSESSMENT: PAIN_FUNCTIONAL_ASSESSMENT: 0-10

## 2024-12-27 NOTE — ANESTHESIA POSTPROCEDURE EVALUATION
Patient: Adam Jones    Procedure Summary       Date: 12/27/24 Room / Location: [facility] OR 06 / [facility] OR    Anesthesia Start: 1107 Anesthesia Stop: 1226    Procedure: REPAIR UMBILICAL HERNIA Diagnosis:       Umbilical hernia without obstruction and without gangrene      (Umbilical hernia without obstruction and without gangrene [K42.9])    Surgeons: Mireille Hopper MD Responsible Provider: Chris Alonzo MD    Anesthesia Type: general ASA Status: 3            Anesthesia Type: general    Vitals Value Taken Time   /68 12/27/24 1253   Temp 36.2 °C (97.2 °F) 12/27/24 1223   Pulse 88 12/27/24 1253   Resp 13 12/27/24 1253   SpO2 95 % 12/27/24 1253       Anesthesia Post Evaluation    Patient location during evaluation: PACU  Patient participation: complete - patient participated  Level of consciousness: awake  Pain score: 2  Pain management: adequate  Multimodal analgesia pain management approach  Airway patency: patent  Two or more strategies used to mitigate risk of obstructive sleep apnea  Cardiovascular status: acceptable  Respiratory status: acceptable  Hydration status: acceptable  Postoperative Nausea and Vomiting: none    No notable events documented.

## 2024-12-27 NOTE — ANESTHESIA PREPROCEDURE EVALUATION
Patient: Adam Jones    Procedure Information       Date/Time: 12/27/24 1100    Procedure: REPAIR UMBILICAL HERNIA - open UH repair with ventralex mesh    Location: [facility] 06 / [facility]    Surgeons: Mireille Hopper MD            Relevant Problems   Cardiac   (+) Mixed hyperlipidemia   (+) Primary hypertension      GI   (+) Gastroesophageal reflux disease without esophagitis      Endocrine   (+) Class 1 obesity without serious comorbidity with body mass index (BMI) of 32.0 to 32.9 in adult       Clinical information reviewed:    Allergies  Meds   Med Hx  Surg Hx            NPO Detail:  NPO/Void Status  Date of Last Liquid: 12/26/24  Date of Last Solid: 12/26/24         Physical Exam    Airway  Mallampati: II  TM distance: >3 FB     Cardiovascular - normal exam     Dental    Pulmonary - normal exam     Abdominal   (+) obese         Anesthesia Plan    History of general anesthesia?: yes  History of complications of general anesthesia?: no    ASA 3     general     The patient is not a current smoker.    intravenous induction   Anesthetic plan and risks discussed with patient.    Plan discussed with CRNA and attending.

## 2024-12-27 NOTE — DISCHARGE INSTRUCTIONS
PATIENT INSTRUCTIONS AFTER HERNIA SURGERY    FOLLOW-UP:   Please make an appointment with Dr Mireille Hopper in 2 weeks. Call Dr. Hopper office at [phone] or come to Emergency Department (ED) with no delay if you have any fevers greater than 102.5 degrees Fahrenheit, drainage from your wound that is not clear or looks infected, persistent bleeding, increasing abdominal pain, problems urinating, or persistent nausea/vomiting or any concerns.     WOUND CARE INSTRUCTIONS:   Keep a dry clean dressing on the wound if there is drainage. Once the wound has quit draining, you may leave it open to air. If clothing rubs against the wound or causes irritation and the wound is not draining, you may cover it with a dry dressing during the daytime. Try to keep the wound dry, and avoid ointments on the wound unless directed to do so. If the wound becomes bright red and painful or starts to drain infected material that is not clear, please contact Dr. Hopper office immediately. If the wound is mildly pink and has a thick firm ridge underneath it, this is normal and is referred to as a healing ridge. This will resolve over the next 4-6 weeks.    If surgery is done in open fashion, staples/sutures will be removed at your follow up appointment if staples/sutures are used to close the skin.   If surgery is done laparoscopically, the incisions are usually covered with skin glue. The sutures are under the skin. No suture removal is needed.     Some bruise around the incision is normal. You should call Dr. Hopper office or come to ED with no delay if the bruise is expanding and painful.     DIET: You may eat any foods that you can tolerate. It is a good idea to eat a high fiber diet, and take in plenty of fluids to prevent constipation. If you do become constipated, you may want to take a mild laxative or take Dulcolax tablets on a daily basis until your bowel habits are regular. After recent abdominal surgery, constipation can be very uncomfortable  and straining.    ACTIVITY: You are encouraged to cough and take deep breaths, or if you were given one, use your incentive spirometer every 15-30 minutes when awake. This will help prevent respiratory complications and low grade fevers post-operatively. You may want to hug a pillow when coughing and sneezing to add additional support to the surgical area which will decrease pain during these times. You are encouraged to walk and engage in light activity for the next two weeks. You should not lift more than 20 pounds during this time frame as it could put you at increased risk for a hernia recurrence. Twenty pounds is roughly equivalent to a plastic bag of groceries.     If surgery is done in open fashion, you should not lift more than 20 pounds or do sit ups for 6-8 weeks. You should also wear an abdominal binder for 6-8 weeks. You do not need to wear binder when you sleep.      MEDICATIONS: Try to take narcotic medications and anti-inflammatory medications, such as Tylenol, ibuprofen, Naprosyn, etc., with food. This will minimize stomach upset from the medication. Should you develop nausea and vomiting from the pain medication, or develop a rash, please discontinue the medication and contact your physician. You should not drive, make important decisions, or operate machinery when taking narcotic pain medication.    IF YOU TAKE BLOOD THINNERS SUCH AS PLAVIX, XARELTO, ELIQUIS, COUMADIN OR OTHERS, YOU CAN RESTART THE MEDICATION ON THIS DATE: 12/29    QUESTIONS: Please feel free to call Dr. Hopper office if you have any questions.

## 2024-12-27 NOTE — ANESTHESIA PROCEDURE NOTES
Airway  Date/Time: 12/27/2024 11:22 AM  Urgency: elective    Airway not difficult    Staffing  Performed: CRNA   Authorized by: Chris Alonzo MD    Performed by: SEGUN Andrews-SAMMI  Patient location during procedure: OR    Indications and Patient Condition  Indications for airway management: anesthesia  Spontaneous Ventilation: absent  Sedation level: deep  Preoxygenated: yes  Patient position: reverse Trendelenburg  Mask difficulty assessment: 0 - not attempted    Final Airway Details  Final airway type: endotracheal airway      Successful airway: ETT  Cuffed: yes   Successful intubation technique: video laryngoscopy  Facilitating devices/methods: intubating stylet  Endotracheal tube insertion site: oral  Blade type: MORRELL.  Blade size: #4  ETT size (mm): 7.5  Cormack-Lehane Classification: grade I - full view of glottis  Placement verified by: chest auscultation and capnometry   Cuff volume (mL): 8  Measured from: lips  ETT to lips (cm): 23  Number of attempts at approach: 1

## 2024-12-27 NOTE — H&P
"History Of Present Illness  Adam Jones is a 54 y.o. male presenting with    Umbilical hernia   Scheduled for repair today   See my note from 10/22      .     Past Medical History  Past Medical History:   Diagnosis Date    Acute sinusitis 11/29/2024    Class 1 obesity with body mass index (BMI) of 33.0 to 33.9 in adult 09/25/2024    Exotropia of left eye     GERD (gastroesophageal reflux disease)     HLD (hyperlipidemia)     HTN (hypertension)     DILAN on CPAP        Surgical History  Past Surgical History:   Procedure Laterality Date    ESOPHAGOGASTRODUODENOSCOPY          Social History  He reports that he quit smoking about 18 years ago. His smoking use included cigarettes. He has never used smokeless tobacco. He reports current alcohol use of about 12.0 standard drinks of alcohol per week. He reports that he does not use drugs.    Family History  No family history on file.     Allergies  Patient has no known allergies.    Review of Systems   All other systems reviewed and are negative.       Physical Exam  Constitutional:       Appearance: Normal appearance.   Pulmonary:      Effort: Pulmonary effort is normal.   Abdominal:      Palpations: Abdomen is soft.          Last Recorded Vitals  Blood pressure 154/78, pulse 88, temperature 36.7 °C (98.1 °F), resp. rate 16, height 1.778 m (5' 10\"), weight 102 kg (225 lb 1.4 oz), SpO2 98%.    Relevant Results              Assessment/Plan   Assessment & Plan    Umbilical hernia scheduled for open repair with mesh today           Mireille Hopper MD    "

## 2024-12-27 NOTE — OP NOTE
REPAIR UMBILICAL HERNIA Operative Note     Date: 2024  OR Location: [facility] OR    Name: Adam oJnes, : 1970, Age: 54 y.o., MRN: [id number], Sex: male    Diagnosis  Pre-op Diagnosis      * Umbilical hernia without obstruction and without gangrene [K42.9] Post-op Diagnosis     * Umbilical hernia without obstruction and without gangrene [K42.9]     Procedures  REPAIR UMBILICAL HERNIA  29431 - AR RPR AA HERNIA 1ST < 3 CM NCRC8/STRANGULATED      Surgeons      * Mireille Hopper - Primary    Resident/Fellow/Other Assistant:  Surgeons and Role:  * No surgeons found with a matching role *    Staff:   Circulator: Bharati Thorne Person: Ayad  Surgical Assistant: Abbey Thorne Person: Christohper  Surgical Assistant: Nivia  Relief Circulator: Dara    Anesthesia Staff: Anesthesiologist: Chris Alonzo MD  CRNA: SEGUN Andrews-CRNA    Procedure Summary  Anesthesia: General  ASA: III  Estimated Blood Loss: 15mL  Intra-op Medications:   Administrations occurring from 1100 to 1245 on 24:   Medication Name Total Dose   BUPivacaine HCl (Marcaine) 0.5 % (5 mg/mL) 20 mL, lidocaine (Xylocaine) 20 mL syringe Cannot be calculated   ceFAZolin (Ancef) vial 1 g 2 g   dexAMETHasone (Decadron) injection 4 mg/mL 8 mg   fentaNYL (Sublimaze) injection 50 mcg/mL 100 mcg   ketorolac (Toradol) injection 30 mg 30 mg   lidocaine (Xylocaine) injection 1 % 50 mg   lidocaine PF (Xylocaine-MPF) local injection 4 % 3 mL   midazolam PF (Versed) injection 1 mg/mL 2 mg   ondansetron (Zofran) 2 mg/mL injection 4 mg   propofol (Diprivan) injection 10 mg/mL 200 mg   rocuronium (ZeMuron) 50 mg/5 mL injection 50 mg   succinylcholine (Anectine) 20 mg/mL injection 200 mg              Anesthesia Record               Intraprocedure I/O Totals       None           Specimen: No specimens collected              Drains and/or Catheters: * None in log *    Tourniquet Times:         Implants:  Implants       Type Name Action Serial No.      Surgical Mesh  Sling Implant PATCH, HERNIA, VENTRALEX ST, LRG, 3.2 X 3.2 - [id number] - [id number] Implanted [id number]              Findings: see below     Indications: Adam Jones is an 54 y.o. male who is having surgery for Umbilical hernia without obstruction and without gangrene [K42.9].      The patient was seen in the preoperative area. The risks, benefits, complications, treatment options, non-operative alternatives, expected recovery and outcomes were discussed with the patient. The possibilities of reaction to medication, pulmonary aspiration, injury to surrounding structures, bleeding, recurrent infection, the need for additional procedures, failure to diagnose a condition, and creating a complication requiring transfusion or operation were discussed with the patient. The patient concurred with the proposed plan, giving informed consent.  The site of surgery was properly noted/marked if necessary per policy. The patient has been actively warmed in preoperative area. Preoperative antibiotics have been ordered and given within 1 hours of incision. Venous thrombosis prophylaxis have been ordered including bilateral sequential compression devices and chemical prophylaxis    Procedure Details:    After general anesthesia, the abdomen was prepped and draped. A periumbilical incision was made. It was carried down through the subcutaneous tissue. The Umbilical stalk/hernia sac was dissected out to fascial level. It was then transected at the fascia level. the defect was about 1.7cm in size. Incarcerated fat tissue was resected. The fascia was then incised to enlarge the defect. A large Ventralex mesh was then inserted. It was sitting on top of omentum. The mesh was secured with transfascial 2-0 Prolene superiorly, inferiorly and on both sides of midline. The fascia was then closed with figure of eight #0 PDS. The umbilical stalk was then tacked down to fascia. The subcutaneous tissue was closed with 3-0 Vicryl after local  anesthetics were injected. The skin was closed with subcuticular 4-0 Vicryl. Skin glue was then applied.        Complications:  None; patient tolerated the procedure well.    Disposition: PACU - hemodynamically stable.  Condition: stable         Task Performed by RNFA or Surgical Assistant:  Position, prep and drape patient   Help with retraction and exposure   Skin closure          Attending Attestation: I was present and scrubbed for the entire procedure.    Mireille Hopper  Phone Number: [phone]

## 2025-01-14 ENCOUNTER — APPOINTMENT (OUTPATIENT)
Dept: SURGERY | Facility: CLINIC | Age: 55
End: 2025-01-14
Payer: COMMERCIAL

## 2025-01-14 VITALS
WEIGHT: 224.9 LBS | HEART RATE: 80 BPM | DIASTOLIC BLOOD PRESSURE: 78 MMHG | SYSTOLIC BLOOD PRESSURE: 133 MMHG | BODY MASS INDEX: 32.27 KG/M2 | OXYGEN SATURATION: 96 %

## 2025-01-14 DIAGNOSIS — Z87.19 HISTORY OF HERNIA REPAIR: Primary | ICD-10-CM

## 2025-01-14 DIAGNOSIS — Z98.890 HISTORY OF HERNIA REPAIR: Primary | ICD-10-CM

## 2025-01-14 PROCEDURE — 3075F SYST BP GE 130 - 139MM HG: CPT | Performed by: SURGERY

## 2025-01-14 PROCEDURE — 99212 OFFICE O/P EST SF 10 MIN: CPT | Performed by: SURGERY

## 2025-01-14 PROCEDURE — 3078F DIAST BP <80 MM HG: CPT | Performed by: SURGERY

## 2025-01-14 NOTE — PROGRESS NOTES
General Surgery Office Follow Up    Chief Complaint:  Follow up     Interval History:  Adam Jones is a 55 y.o. male who came today for follow up.   Had UH repair with Ventralex mesh on   Doing well   No complaints   No problem with eating or bowel function   Some pressure or discomfort when voiding, better over time     Past Medical History:  Past Medical History:   Diagnosis Date    Acute sinusitis 2024    Class 1 obesity with body mass index (BMI) of 33.0 to 33.9 in adult 2024    Exotropia of left eye     GERD (gastroesophageal reflux disease)     HLD (hyperlipidemia)     HTN (hypertension)     DILAN on CPAP         Past Surgical History:  Past Surgical History:   Procedure Laterality Date    ESOPHAGOGASTRODUODENOSCOPY          Medications:  Current Outpatient Medications   Medication Instructions    atorvastatin (LIPITOR) 20 mg, oral, Daily    fluticasone (Flonase) 50 mcg/actuation nasal spray 1 spray, Each Nostril, Daily, Shake gently. Before first use, prime pump. After use, clean tip and replace cap.    HYDROcodone-acetaminophen (Norco) 5-325 mg tablet 1 tablet, oral, Every 6 hours PRN    lisinopril 20 mg, oral, Daily    omeprazole (PRILOSEC) 20 mg, oral, Daily        Allergies:  No Known Allergies     Family History:  No family history on file.     Social History:  Social History     Socioeconomic History    Marital status:    Tobacco Use    Smoking status: Former     Current packs/day: 0.00     Types: Cigarettes     Quit date: 2006     Years since quittin.8    Smokeless tobacco: Never   Substance and Sexual Activity    Alcohol use: Yes     Alcohol/week: 12.0 standard drinks of alcohol     Types: 12 Cans of beer per week    Drug use: Never        Review of Systems:  A complete 12 point review of systems was performed. It is otherwise negative except as noted in the above interval history.     Vital Signs:  Vitals:    25 0829   BP: 133/78   Pulse: 80   SpO2: 96%       Body mass index is 32.27 kg/m².      Physical Exam:  General: No acute distress.   Neuro: Alert and oriented ×3. Follows commands.  Face: Atraumatic, no visible skin lesion.   Head: Atraumatic  Eyes: Pupils equal reactive to light. Extraocular motions intact.  Ears: Hears normal speaking voice.  Mouth, Nose, Throat: Mucous membranes moist.  Normal dentition.  Neck: Supple. No visible masses.  Breast: Not examined.   Lung: Patent airway and no labored breath.   Vascular: Palpable radial pulses bilaterally.  Abdomen: soft, NT, ND. Incision healing well. No bulge.   Rectal and Perianal: Not examined.   Genitourinary: Not examined.   Musculoskeletal: Moves all extremities.   Extremities: No cyanosis. No edema.   Skin: No rashes or lesions.  Psychological: Normal affect      Laboratory Values:  CBC:   Lab Results   Component Value Date    WBC 5.6 09/25/2024    RBC 5.07 09/25/2024    HGB 14.5 09/25/2024    HCT 43.6 09/25/2024     09/25/2024       RFP:   Lab Results   Component Value Date     09/25/2024    K 4.0 09/25/2024     09/25/2024    CO2 27 09/25/2024    BUN 12 09/25/2024    CREATININE 0.79 09/25/2024    CALCIUM 9.6 09/25/2024    MG 1.87 05/14/2018        LFTs:   Lab Results   Component Value Date    PROT 7.1 09/25/2024    ALBUMIN 4.4 09/25/2024    BILITOT 0.7 09/25/2024    ALKPHOS 71 09/25/2024    AST 25 09/25/2024    ALT 36 09/25/2024            Imaging:  I have personally reviewed the images and the radiologist's report.  No results found.      Assessment:  This is a 55 y.o. male who has   S/P UH repair with Ventralex mesh   Adequate progress     Plan:  See me back in one month   Continue to avoid lifting more than 20LB and to wear binder for another 6 weeks   Monitor voiding   Call if any concern       Liming MD [facility]  General Surgery  Office: [phone]  Fax:     [phone]  10:07 AM   01/14/25

## 2025-02-17 ENCOUNTER — APPOINTMENT (OUTPATIENT)
Dept: SURGERY | Facility: CLINIC | Age: 55
End: 2025-02-17
Payer: COMMERCIAL

## 2025-02-17 VITALS
BODY MASS INDEX: 33.19 KG/M2 | HEART RATE: 83 BPM | WEIGHT: 231.3 LBS | SYSTOLIC BLOOD PRESSURE: 155 MMHG | DIASTOLIC BLOOD PRESSURE: 80 MMHG | OXYGEN SATURATION: 97 %

## 2025-02-17 DIAGNOSIS — Z98.890 HISTORY OF HERNIA REPAIR: Primary | ICD-10-CM

## 2025-02-17 DIAGNOSIS — Z87.19 HISTORY OF HERNIA REPAIR: Primary | ICD-10-CM

## 2025-02-17 PROCEDURE — 3077F SYST BP >= 140 MM HG: CPT | Performed by: SURGERY

## 2025-02-17 PROCEDURE — 3079F DIAST BP 80-89 MM HG: CPT | Performed by: SURGERY

## 2025-02-17 PROCEDURE — 99212 OFFICE O/P EST SF 10 MIN: CPT | Performed by: SURGERY

## 2025-02-17 NOTE — PROGRESS NOTES
General Surgery Office Follow Up    Chief Complaint:  Follow up     Interval History:  Adam Jones is a 55 y.o. male who came today for follow up.   Had open [facility] repair with large Ventralex mesh   Doing well   No complaint     Past Medical History:  Past Medical History:   Diagnosis Date    Acute sinusitis 2024    Class 1 obesity with body mass index (BMI) of 33.0 to 33.9 in adult 2024    Exotropia of left eye     GERD (gastroesophageal reflux disease)     HLD (hyperlipidemia)     HTN (hypertension)     DILAN on CPAP         Past Surgical History:  Past Surgical History:   Procedure Laterality Date    ESOPHAGOGASTRODUODENOSCOPY          Medications:  Current Outpatient Medications   Medication Instructions    atorvastatin (LIPITOR) 20 mg, oral, Daily    fluticasone (Flonase) 50 mcg/actuation nasal spray 1 spray, Each Nostril, Daily, Shake gently. Before first use, prime pump. After use, clean tip and replace cap.    HYDROcodone-acetaminophen (Norco) 5-325 mg tablet 1 tablet, oral, Every 6 hours PRN    lisinopril 20 mg, oral, Daily    omeprazole (PRILOSEC) 20 mg, oral, Daily        Allergies:  No Known Allergies     Family History:  No family history on file.     Social History:  Social History     Socioeconomic History    Marital status:    Tobacco Use    Smoking status: Former     Current packs/day: 0.00     Types: Cigarettes     Quit date: 2006     Years since quittin.8    Smokeless tobacco: Never   Substance and Sexual Activity    Alcohol use: Yes     Alcohol/week: 12.0 standard drinks of alcohol     Types: 12 Cans of beer per week    Drug use: Never        Review of Systems:  A complete 12 point review of systems was performed. It is otherwise negative except as noted in the above interval history.     Vital Signs:  Vitals:    25 0847   BP: 155/80   Pulse: 83   SpO2: 97%      Body mass index is 33.19 kg/m².      Physical Exam:  General: No acute distress.   Neuro: Alert and  oriented ×3. Follows commands.  Face: Atraumatic, no visible skin lesion.   Head: Atraumatic  Eyes: Pupils equal reactive to light. Extraocular motions intact.  Ears: Hears normal speaking voice.  Mouth, Nose, Throat: Mucous membranes moist.   Neck: Supple. No visible masses.  Breast: Not examined.   Lung: Patent airway and no labored breath.   Vascular: Palpable radial pulses bilaterally.  Abdomen: soft, NT, ND.    Rectal and Perianal: Not examined.   Genitourinary: Not examined.   Musculoskeletal: Moves all extremities.   Extremities: No cyanosis.  Skin: No rashes or lesions.  Psychological: Normal affect      Laboratory Values:  CBC:   Lab Results   Component Value Date    WBC 5.6 09/25/2024    RBC 5.07 09/25/2024    HGB 14.5 09/25/2024    HCT 43.6 09/25/2024     09/25/2024       RFP:   Lab Results   Component Value Date     09/25/2024    K 4.0 09/25/2024     09/25/2024    CO2 27 09/25/2024    BUN 12 09/25/2024    CREATININE 0.79 09/25/2024    CALCIUM 9.6 09/25/2024    MG 1.87 05/14/2018        LFTs:   Lab Results   Component Value Date    PROT 7.1 09/25/2024    ALBUMIN 4.4 09/25/2024    BILITOT 0.7 09/25/2024    ALKPHOS 71 09/25/2024    AST 25 09/25/2024    ALT 36 09/25/2024            Imaging:  I have personally reviewed the images and the radiologist's report.  No results found.      Assessment:  This is a 55 y.o. male who has   S/P open [facility] repair with Ventralex mesh 12/27  Doing well     Plan:  Ok to resume normal activities slowly   Follow up with me as needed   Call if any concern       Mireille Hopper MD [facility]  General Surgery  Office: [phone]  Fax:     [phone]  9:18 AM   02/17/25

## 2025-02-18 ENCOUNTER — APPOINTMENT (OUTPATIENT)
Dept: SURGERY | Facility: CLINIC | Age: 55
End: 2025-02-18
Payer: COMMERCIAL

## 2025-05-08 ENCOUNTER — OFFICE VISIT (OUTPATIENT)
Dept: PRIMARY CARE | Facility: CLINIC | Age: 55
End: 2025-05-08
Payer: COMMERCIAL

## 2025-05-08 VITALS
SYSTOLIC BLOOD PRESSURE: 124 MMHG | DIASTOLIC BLOOD PRESSURE: 74 MMHG | BODY MASS INDEX: 33 KG/M2 | OXYGEN SATURATION: 98 % | WEIGHT: 230 LBS | HEART RATE: 96 BPM | TEMPERATURE: 100.5 F

## 2025-05-08 DIAGNOSIS — R50.9 FEVER, UNSPECIFIED FEVER CAUSE: Primary | ICD-10-CM

## 2025-05-08 LAB
POC BINAX EXPIRATION: NORMAL
POC BINAX NOW COVID SERIAL NUMBER: NORMAL
POC RAPID INFLUENZA A: NEGATIVE
POC RAPID INFLUENZA B: NEGATIVE
POC SARS-COV-2 AG BINAX: NORMAL

## 2025-05-08 PROCEDURE — 1036F TOBACCO NON-USER: CPT

## 2025-05-08 PROCEDURE — 87804 INFLUENZA ASSAY W/OPTIC: CPT

## 2025-05-08 PROCEDURE — 3078F DIAST BP <80 MM HG: CPT

## 2025-05-08 PROCEDURE — 87811 SARS-COV-2 COVID19 W/OPTIC: CPT

## 2025-05-08 PROCEDURE — 3074F SYST BP LT 130 MM HG: CPT

## 2025-05-08 PROCEDURE — 99213 OFFICE O/P EST LOW 20 MIN: CPT

## 2025-05-08 ASSESSMENT — PATIENT HEALTH QUESTIONNAIRE - PHQ9
1. LITTLE INTEREST OR PLEASURE IN DOING THINGS: NOT AT ALL
SUM OF ALL RESPONSES TO PHQ9 QUESTIONS 1 AND 2: 0
2. FEELING DOWN, DEPRESSED OR HOPELESS: NOT AT ALL

## 2025-05-08 NOTE — PROGRESS NOTES
Subjective   Patient ID: Adam Jones is a 55 y.o. male who presents for Fever and Headache (Since Sunday, per pt does not feel good).  HPI  Adam prsents for not feeling well  -Started with slight fever, headache Sunday (on day 5)   -Every day since has felt the same  -Minimal cough  -Son has been sick with cough and cold   -Chills, body aches  -No runny nose, no sinus pressure   -No ear pain   -No diarrhea   -No vomiting   -Not hungry really   -Fatigued   -Taking tylenol which helped some   -Tried nyquil did not make a difference  -No dysuria       Surgical History[1]   Medical History[2]  Social History[3]     Review of Systems  10 point review of systems performed and is negative except as noted in the HPI.    Current Medications[4]     Objective   /74   Pulse 96   Temp (!) 38.1 °C (100.5 °F)   Wt 104 kg (230 lb)   SpO2 98%   BMI 33.00 kg/m²     Physical Exam  Constitutional:       Appearance: Normal appearance.   HENT:      Head: Normocephalic and atraumatic.      Right Ear: Tympanic membrane, ear canal and external ear normal.      Left Ear: Tympanic membrane, ear canal and external ear normal.      Nose: Nose normal.      Right Sinus: No maxillary sinus tenderness or frontal sinus tenderness.      Left Sinus: No maxillary sinus tenderness or frontal sinus tenderness.      Mouth/Throat:      Mouth: Mucous membranes are moist.      Pharynx: Oropharynx is clear.   Eyes:      Conjunctiva/sclera: Conjunctivae normal.      Pupils: Pupils are equal, round, and reactive to light.   Cardiovascular:      Rate and Rhythm: Normal rate and regular rhythm.      Heart sounds: Normal heart sounds.   Pulmonary:      Effort: Pulmonary effort is normal.      Breath sounds: Normal breath sounds. No wheezing, rhonchi or rales.   Abdominal:      General: Bowel sounds are normal.      Palpations: Abdomen is soft.      Tenderness: There is no abdominal tenderness.   Lymphadenopathy:      Cervical: No cervical adenopathy.    Skin:     General: Skin is warm and dry.   Neurological:      Mental Status: He is alert and oriented to person, place, and time.         Assessment & Plan  Fever, unspecified fever cause  Rapid covid and flu negative   Discussed still likely viral etiology   Recommend supportive care for now   Discussed letting me know if not improving in the next few days  Orders:    POCT BinaxNOW Covid-19 Ag Card manually resulted    POCT Influenza A/B manually resulted          Discussed at visit any disease processes that were of concern as well as the risks, benefits and instructions on any new medication provided. Patient (and/or caretaker of patient if present) stated all questions were answered, and they voiced understanding of instructions.      Cathy Berg PA-C       [1]   Past Surgical History:  Procedure Laterality Date    ESOPHAGOGASTRODUODENOSCOPY     [2]   Past Medical History:  Diagnosis Date    Acute sinusitis 2024    Class 1 obesity with body mass index (BMI) of 33.0 to 33.9 in adult 2024    Exotropia of left eye     GERD (gastroesophageal reflux disease)     HLD (hyperlipidemia)     HTN (hypertension)     DILAN on CPAP    [3]   Social History  Tobacco Use    Smoking status: Former     Current packs/day: 0.00     Types: Cigarettes     Quit date: 2006     Years since quittin.1    Smokeless tobacco: Never   Vaping Use    Vaping status: Never Used   Substance Use Topics    Alcohol use: Yes     Alcohol/week: 12.0 standard drinks of alcohol     Types: 12 Cans of beer per week    Drug use: Never   [4]   Current Outpatient Medications:     atorvastatin (Lipitor) 20 mg tablet, Take 1 tablet (20 mg) by mouth once daily., Disp: 90 tablet, Rfl: 3    fluticasone (Flonase) 50 mcg/actuation nasal spray, Administer 1 spray into each nostril once daily. Shake gently. Before first use, prime pump. After use, clean tip and replace cap., Disp: 16 g, Rfl: 11    lisinopril 20 mg tablet, Take 1 tablet (20 mg) by  mouth once daily., Disp: 90 tablet, Rfl: 3    omeprazole (PriLOSEC) 20 mg DR capsule, Take 1 capsule (20 mg) by mouth once daily., Disp: 90 capsule, Rfl: 3    HYDROcodone-acetaminophen (Norco) 5-325 mg tablet, Take 1 tablet by mouth every 6 hours if needed for severe pain (7 - 10) or moderate pain (4 - 6) (ok to take 2 pills for severe pain). (Patient not taking: Reported on 5/8/2025), Disp: 30 tablet, Rfl: 0

## 2025-09-25 ENCOUNTER — APPOINTMENT (OUTPATIENT)
Dept: PRIMARY CARE | Facility: CLINIC | Age: 55
End: 2025-09-25
Payer: COMMERCIAL

## (undated) DEVICE — SUTURE, PROLENE, 2-0, 30 IN, CT2, BLUE

## (undated) DEVICE — HOLSTER, BOVIE, ES PENCIL, DISP

## (undated) DEVICE — APPLICATOR, CHLORAPREP, W/ORANGE TINT, 26ML

## (undated) DEVICE — GLOVE, SURGICAL, PROTEXIS PI ORTHO, 7.0, PF, LF

## (undated) DEVICE — DRAPE, SHEET, LAPAROTOMY, W/ISO-BAC, W/ARMBOARD COVERS, 98 X 122 IN, DISPOSABLE, LF, STERILE

## (undated) DEVICE — SUTURE, VICRYL, 4-0, 18 IN, PS2, UNDYED

## (undated) DEVICE — Device

## (undated) DEVICE — PAD, GROUNDING, ELECTROSURGICAL, W/9 FT CABLE, POLYHESIVE II, ADULT, LF

## (undated) DEVICE — SUTURE, PDS II, 0, 27 IN, CT-2, VIOLET

## (undated) DEVICE — DISSECTOR, EXACT, LIGASURE

## (undated) DEVICE — DRAPE PACK, MINOR, CUSTOM, GEAUGA

## (undated) DEVICE — SOLUTION, IRRIGATION, SODIUM CHLORIDE 0.9%, 1000 ML, POUR BOTTLE

## (undated) DEVICE — ADHESIVE, SKIN, DERMABOND ADVANCED, 15CM, PEN-STYLE

## (undated) DEVICE — CAUTERY, PENCIL, PUSH BUTTON, SMOKE EVAC, 70MM